# Patient Record
Sex: MALE | Race: WHITE | NOT HISPANIC OR LATINO | Employment: OTHER | URBAN - METROPOLITAN AREA
[De-identification: names, ages, dates, MRNs, and addresses within clinical notes are randomized per-mention and may not be internally consistent; named-entity substitution may affect disease eponyms.]

---

## 2019-08-22 ENCOUNTER — TELEPHONE (OUTPATIENT)
Dept: GASTROENTEROLOGY | Facility: CLINIC | Age: 69
End: 2019-08-22

## 2019-08-22 DIAGNOSIS — R79.89 ELEVATED LIVER FUNCTION TESTS: Primary | ICD-10-CM

## 2019-08-22 NOTE — TELEPHONE ENCOUNTER
[facility] GI consult reviewed and noted that recommendation to have repeat LFT's drawn 4 weeks post discharge  Order placed to [facility] and patient notified

## 2019-08-22 NOTE — TELEPHONE ENCOUNTER
Pt called he was d/c from [facility] and told to f u with us  Pt is scheduled in sept with Che Melgar  He asks if he should have any labs done prior to being seen  Has not been seen with us  Pt has diabetic orders from pcp and and psa order from urology  Pt goes to [facility] and is having labs done on 09/04     cb # [phone]    ce

## 2019-09-05 LAB
ALBUMIN SERPL-MCNC: 4 G/DL (ref 3.6–5.1)
ALBUMIN/GLOB SERPL: 1.3 (CALC) (ref 1–2.5)
ALP SERPL-CCNC: 69 U/L (ref 40–115)
ALT SERPL-CCNC: 14 U/L (ref 9–46)
AST SERPL-CCNC: 17 U/L (ref 10–35)
BILIRUB DIRECT SERPL-MCNC: 0.2 MG/DL
BILIRUB INDIRECT SERPL-MCNC: 0.5 MG/DL (CALC) (ref 0.2–1.2)
BILIRUB SERPL-MCNC: 0.7 MG/DL (ref 0.2–1.2)
GLOBULIN SER CALC-MCNC: 3 G/DL (CALC) (ref 1.9–3.7)
PROT SERPL-MCNC: 7 G/DL (ref 6.1–8.1)

## 2019-09-12 NOTE — RESULT ENCOUNTER NOTE
Called patient to advise that his liver function studies are completely within normal range  Left message on the patient's voicemail

## 2021-07-07 ENCOUNTER — TELEPHONE (OUTPATIENT)
Dept: GASTROENTEROLOGY | Facility: CLINIC | Age: 71
End: 2021-07-07

## 2021-07-07 NOTE — TELEPHONE ENCOUNTER
Per Dr Katelynn Stack, please arrange in a month a colonoscopy for Antoine Gamboa  He needs to be off anticoagulants  He was in [facility] 07/06/2021 ER   Thx

## 2021-07-14 ENCOUNTER — TELEPHONE (OUTPATIENT)
Dept: GASTROENTEROLOGY | Facility: CLINIC | Age: 71
End: 2021-07-14

## 2021-07-14 NOTE — TELEPHONE ENCOUNTER
07/14/21  Screened by: Kisha Martinez    Referring Provider khris    Pre- Screening: There is no height or weight on file to calculate BMI  Has patient been referred for a routine screening Colonoscopy? no  Is the patient between 39-70 years old? yes      Previous Colonoscopy yes   If yes:    Date: 08/05/2016    Facility: [facility]    Reason: Michaela Alvarez      SCHEDULING STAFF: If the patient is between 39yrs-47yrs, please advise patient to confirm benefits/coverage with their insurance company for a routine screening colonoscopy, some insurance carriers will only cover at [facility] or older  If the patient is over 66years old, please schedule an office visit  Does the patient want to see a Gastroenterologist prior to their procedure OR are they having any GI symptoms? no    Has the patient been hospitalized or had abdominal surgery in the past 6 months? yes    Does the patient use supplemental oxygen? no    Does the patient take Coumadin, Lovenox, Plavix, Elliquis, Xarelto, or other blood thinning medication? yes    Has the patient had a stroke, cardiac event, or stent placed in the past year? yes    SCHEDULING STAFF: If patient answers NO to above questions, then schedule procedure  If patient answers YES to above questions, then schedule office appointment  If patient is between 45yrs - 49yrs, please advise patient that we will have to confirm benefits & coverage with their insurance company for a routine screening colonoscopy

## 2021-07-14 NOTE — TELEPHONE ENCOUNTER
Diego Dubose called to schedule needs office visit for anti coag, Recent bypass surgery  Checking with Dr Zenaida Preciado to advise   Patients number [phone]

## 2021-08-12 ENCOUNTER — TELEPHONE (OUTPATIENT)
Dept: GASTROENTEROLOGY | Facility: CLINIC | Age: 71
End: 2021-08-12

## 2021-08-12 VITALS — WEIGHT: 201 LBS | BODY MASS INDEX: 28.77 KG/M2 | HEIGHT: 70 IN

## 2021-08-12 RX ORDER — LANOLIN ALCOHOL/MO/W.PET/CERES
1000 CREAM (GRAM) TOPICAL DAILY
COMMUNITY

## 2021-08-12 RX ORDER — EMPAGLIFLOZIN 25 MG/1
25 TABLET, FILM COATED ORAL EVERY MORNING
COMMUNITY

## 2021-08-12 RX ORDER — GLIMEPIRIDE 2 MG/1
2 TABLET ORAL
COMMUNITY

## 2021-08-12 RX ORDER — AMIODARONE HYDROCHLORIDE 200 MG/1
200 TABLET ORAL DAILY
COMMUNITY

## 2021-08-12 RX ORDER — LANOLIN ALCOHOL/MO/W.PET/CERES
400 CREAM (GRAM) TOPICAL DAILY
COMMUNITY

## 2021-08-12 RX ORDER — FUROSEMIDE 20 MG/1
20 TABLET ORAL DAILY
COMMUNITY

## 2021-08-12 RX ORDER — ATORVASTATIN CALCIUM 40 MG/1
40 TABLET, FILM COATED ORAL DAILY
COMMUNITY

## 2021-08-12 RX ORDER — MULTIVITAMIN
1 CAPSULE ORAL DAILY
COMMUNITY